# Patient Record
Sex: FEMALE
[De-identification: names, ages, dates, MRNs, and addresses within clinical notes are randomized per-mention and may not be internally consistent; named-entity substitution may affect disease eponyms.]

---

## 2022-11-17 ENCOUNTER — APPOINTMENT (OUTPATIENT)
Dept: INTERNAL MEDICINE | Facility: OTHER | Age: 62
End: 2022-11-17

## 2022-11-17 VITALS
DIASTOLIC BLOOD PRESSURE: 73 MMHG | HEART RATE: 72 BPM | RESPIRATION RATE: 16 BRPM | SYSTOLIC BLOOD PRESSURE: 126 MMHG | TEMPERATURE: 98.1 F

## 2022-11-17 DIAGNOSIS — Z86.79 PERSONAL HISTORY OF OTHER DISEASES OF THE CIRCULATORY SYSTEM: ICD-10-CM

## 2022-11-17 DIAGNOSIS — R04.0 EPISTAXIS: ICD-10-CM

## 2022-11-17 DIAGNOSIS — Z00.01 ENCOUNTER FOR GENERAL ADULT MEDICAL EXAMINATION WITH ABNORMAL FINDINGS: ICD-10-CM

## 2022-11-17 DIAGNOSIS — K21.9 GASTRO-ESOPHAGEAL REFLUX DISEASE W/OUT ESOPHAGITIS: ICD-10-CM

## 2022-11-17 DIAGNOSIS — F17.200 NICOTINE DEPENDENCE, UNSPECIFIED, UNCOMPLICATED: ICD-10-CM

## 2022-11-17 DIAGNOSIS — K59.09 OTHER CONSTIPATION: ICD-10-CM

## 2022-11-17 DIAGNOSIS — Z86.19 PERSONAL HISTORY OF OTHER INFECTIOUS AND PARASITIC DISEASES: ICD-10-CM

## 2022-11-17 DIAGNOSIS — Z82.49 FAMILY HISTORY OF ISCHEMIC HEART DISEASE AND OTHER DISEASES OF THE CIRCULATORY SYSTEM: ICD-10-CM

## 2022-11-17 PROBLEM — Z00.00 ENCOUNTER FOR PREVENTIVE HEALTH EXAMINATION: Status: ACTIVE | Noted: 2022-11-17

## 2022-11-17 PROCEDURE — 99212 OFFICE O/P EST SF 10 MIN: CPT | Mod: 25

## 2022-11-17 PROCEDURE — 99386 PREV VISIT NEW AGE 40-64: CPT | Mod: GY,25

## 2022-11-17 RX ORDER — IBUPROFEN 800 MG/1
800 TABLET, FILM COATED ORAL 3 TIMES DAILY
Refills: 0 | Status: ACTIVE | COMMUNITY
Start: 2022-11-17

## 2023-02-16 ENCOUNTER — APPOINTMENT (OUTPATIENT)
Dept: INTERNAL MEDICINE | Facility: OTHER | Age: 63
End: 2023-02-16
Payer: MEDICARE

## 2023-02-16 VITALS
DIASTOLIC BLOOD PRESSURE: 80 MMHG | RESPIRATION RATE: 16 BRPM | SYSTOLIC BLOOD PRESSURE: 160 MMHG | HEART RATE: 70 BPM | OXYGEN SATURATION: 99 % | TEMPERATURE: 98 F

## 2023-02-16 PROCEDURE — 99213 OFFICE O/P EST LOW 20 MIN: CPT

## 2023-02-16 NOTE — HISTORY OF PRESENT ILLNESS
[FreeTextEntry1] : Follow up [de-identified] : This is a 62F with PMHx of HTN (on lisinopril), chronic constipation, hx of syphilis, who presents for folllow up. She is complaining of tooth abscesses today, where she's had "rotting teeth and swelling for one year". she has never seen a dentist. Admits to swelling, but denies fevers, chills, purulent discharge, redness, jaw pain, ear pain, in the affected area. She would like to see a dentist to have her teeth removed.\par \par She remains compliant on lisinopril and typically takes the medication at night. Denies chest pain, headache, palpitations, LE edema, SOB, or other related symptoms.

## 2023-02-16 NOTE — ASSESSMENT
[FreeTextEntry1] : #HTN\par - C/w lisinopril\par - BP in patient 160s/80s, asymptomatic\par - Refills today\par - Obtain labs: CMP, Urine alb/Cr for baseline\par - F/u in 4 weeks for BP checkup\par \par #Gingivitis\par - Advised SW for dental care assistance\par - Swelling around gums in bottom molars, no purulent discharge, masses\par - No fevers, chills, jaw pain, ear pain, throat pain, LAD on exam\par \par #HCM\par - Discussed importance of mammogram and c-scope this visit, will give referral on next visit\par -Deferred covid/flu shot\par - Labs to be obtained: CBC, CMP, Urine Alb/Cr, Lipid panel, TSH, Hep C, HIV\par - Amenable to tdap shot, deferred shingrix

## 2023-02-16 NOTE — END OF VISIT
[] : Resident [FreeTextEntry3] : 62F presenting for follow up to [facility]. Reports dental discomfort, would like a referral to a dentist, has not seen one for some time. Has been traveling, has lisinopril medication bottle with her with most recent refill from prescriber from [facility] fall 2022. On exam, flat affect, answers questions  Very poor dentition, multiple broken teeth, +gingivitis, no active bleeding or drainage. BP above goal. Reordered lab work from initial visit and requested that she have it drawn at [address] [place] [address] lab when able. Dental referral placed, sent email to her SW at the [facility], [email or web address], to request assistance in finding pt a dentist covered by her plan. RTC 1 month for f/u above or earlier prn.

## 2023-03-23 ENCOUNTER — APPOINTMENT (OUTPATIENT)
Dept: INTERNAL MEDICINE | Facility: OTHER | Age: 63
End: 2023-03-23
Payer: MEDICARE

## 2023-03-23 VITALS
DIASTOLIC BLOOD PRESSURE: 97 MMHG | SYSTOLIC BLOOD PRESSURE: 183 MMHG | OXYGEN SATURATION: 97 % | HEART RATE: 83 BPM | TEMPERATURE: 97.3 F

## 2023-03-23 DIAGNOSIS — K05.10 CHRONIC GINGIVITIS, PLAQUE INDUCED: ICD-10-CM

## 2023-03-23 DIAGNOSIS — I10 ESSENTIAL (PRIMARY) HYPERTENSION: ICD-10-CM

## 2023-03-23 PROCEDURE — 99213 OFFICE O/P EST LOW 20 MIN: CPT | Mod: GC

## 2023-03-23 RX ORDER — LISINOPRIL 10 MG/1
10 TABLET ORAL DAILY
Qty: 30 | Refills: 0 | Status: DISCONTINUED | COMMUNITY
Start: 2022-11-17 | End: 2023-03-23

## 2023-03-23 RX ORDER — FAMOTIDINE 40 MG/1
40 TABLET, FILM COATED ORAL DAILY
Qty: 30 | Refills: 1 | Status: DISCONTINUED | COMMUNITY
Start: 2022-11-17 | End: 2023-03-23

## 2023-03-23 RX ORDER — AMLODIPINE BESYLATE 5 MG/1
5 TABLET ORAL
Qty: 90 | Refills: 3 | Status: ACTIVE | COMMUNITY
Start: 2023-03-23 | End: 1900-01-01

## 2023-03-23 RX ORDER — CALCIUM CARBONATE 500 MG/1
500 TABLET, CHEWABLE ORAL 3 TIMES DAILY
Qty: 90 | Refills: 0 | Status: DISCONTINUED | COMMUNITY
Start: 2022-11-17 | End: 2023-03-23

## 2023-03-26 PROBLEM — I10 HYPERTENSION, ESSENTIAL: Status: ACTIVE | Noted: 2022-11-17

## 2023-03-26 PROBLEM — K05.10 GINGIVITIS, CHRONIC: Status: ACTIVE | Noted: 2023-02-16

## 2023-03-26 NOTE — END OF VISIT
[] : Resident [FreeTextEntry3] : Dental/ gum pain - taking daily nsaids.  Pt will set up a dental appt. \par HTN - Unknown current BMP/cr/ electrolytes while taking acei. For medication interaction safety , change BP med to CCB. \par RTO 1 mo\par Labs this week/next wk.

## 2023-03-26 NOTE — REVIEW OF SYSTEMS
[Fever] : no fever [Chills] : no chills [Chest Pain] : no chest pain [Lower Ext Edema] : no lower extremity edema [Shortness Of Breath] : no shortness of breath [Abdominal Pain] : no abdominal pain [Nausea] : no nausea [Constipation] : no constipation [Diarrhea] : diarrhea [Vomiting] : no vomiting [Heartburn] : no heartburn [Melena] : no melena [Dysuria] : no dysuria [Frequency] : no frequency [Headache] : no headache [FreeTextEntry7] : -no blood in stool

## 2023-03-26 NOTE — HISTORY OF PRESENT ILLNESS
[FreeTextEntry1] : tooth pain [de-identified] : 62F with PMHx of HTN (on lisinopril), chronic constipation, hx of syphilis, who presents for follow-up visit and BP check. Pt c/o persistent tooth and gum pain x 1 year. She last saw a dentist in September 2022, who wanted to remove one of her teeth, but pt declined at that time because they did not prescribe antibiotics. She was recently in [place] and an NP prescribed amoxicillin 875, she finished the course but has had no relief of her symptoms. She c/o persistent tooth/gum pain bilaterally, has noticed purulent drainage. Pt uses ibuprofen daily for tooth pain, tylenol hurts her stomach. Denies headaches, fevers, chills, CP, SOB, abd pain, n/v/c/d. No other complaints at this time. Pt has had HTN for 15 years and was previously on enalapril, has been on lisinopril 10 mg for 2 years w/ no recent changes. She does not check her BP outside visits. She would like a refill on her lisinopril. Pt is an active smoker, 1 ppd, not interested in cutting down or quitting at this time as she has a lot of stressors and smokes to help relieve her stress. She is not interested in talking about these stressors with a therapist at this time.

## 2023-03-26 NOTE — PHYSICAL EXAM
[de-identified] : +poor dentition, swollen gums [de-identified] : +guarded, not interested in discussing personal topics

## 2023-03-26 NOTE — ASSESSMENT
[FreeTextEntry1] : 62F with PMHx of HTN (on lisinopril), chronic constipation, hx of syphilis, who presents for follow-up visit and BP check.\par \par #HTN\par Home meds: lisinopril 10 mg. Pt had HTN for 15 years and has been on lisinopril 10 mg for ~2 years w/ no recent changes. She previously used enalapril. Believes she has tried amlodipine in the past w/ no adverse effects. Pt uses ibuprofen daily for tooth pain, and is not able to take tylenol because it causes abdominal pain. /97 today, 178/91 on repeat, not at goal.\par - d/c lisinopril, avoid ACE-i w/ daily NSAID use\par - start amlodipine 5 mg qd -- sent to pharmacy\par - advised to keep BP log\par - lifestyle and dietary modifications discussed at length w/ pt\par - counseled on smoking cessation\par - obtain labs: CMP, urine alb/Cr for baseline (at [facility] tomorrow)\par - f/u in 4 weeks for BP check -- consider increasing amlodipine\par \par #Gingivitis/tooth pain\par Pt has gingivitis and tooth pain for ~ 1 year, saw a dentist in September 2022 who wanted to remove one of her teeth, but pt declined at that time because they did not prescribe abx. She was also prescribed amoxicillin 875 by an NP in [place] and completed the course w/ no relief. Currently reports pain in her teeth/gums and purulent drainage. No feves, chills, jaw pain, ear pain, throat pain. No purulent discharge noted on exam, however pt has poor dentition and swollen gums.\par - advised to discuss w/ SW for dental clinic at [facility] -- pt is agreeable\par \par #HCM\par - deferred COVID vaccine\par - deferred flu shot\par - pt needs referral for c-scope and mammogram during next visit\par - labs to be obtained at [facility]: CBC, CMP, urine alb/Cr, lipid panel, TSH, hep C, HIV\par - amenable to tdap shot\par - deferred shingrix\par \par RTC in 4 weeks for BP check at [facility]. Labs tomorrow 3/24 at [facility].

## 2023-05-09 ENCOUNTER — EMERGENCY (EMERGENCY)
Facility: HOSPITAL | Age: 63
LOS: 1 days | Discharge: HOME | End: 2023-05-09
Admitting: EMERGENCY MEDICINE
Payer: MEDICARE

## 2023-05-09 VITALS
TEMPERATURE: 98 F | RESPIRATION RATE: 18 BRPM | OXYGEN SATURATION: 100 % | WEIGHT: 158.07 LBS | HEART RATE: 78 BPM | SYSTOLIC BLOOD PRESSURE: 177 MMHG | DIASTOLIC BLOOD PRESSURE: 82 MMHG

## 2023-05-09 DIAGNOSIS — K04.7 PERIAPICAL ABSCESS WITHOUT SINUS: ICD-10-CM

## 2023-05-09 DIAGNOSIS — I10 ESSENTIAL (PRIMARY) HYPERTENSION: ICD-10-CM

## 2023-05-09 DIAGNOSIS — F17.200 NICOTINE DEPENDENCE, UNSPECIFIED, UNCOMPLICATED: ICD-10-CM

## 2023-05-09 DIAGNOSIS — K02.9 DENTAL CARIES, UNSPECIFIED: ICD-10-CM

## 2023-05-09 DIAGNOSIS — F31.9 BIPOLAR DISORDER, UNSPECIFIED: ICD-10-CM

## 2023-05-09 DIAGNOSIS — K08.89 OTHER SPECIFIED DISORDERS OF TEETH AND SUPPORTING STRUCTURES: ICD-10-CM

## 2023-05-09 PROCEDURE — 99283 EMERGENCY DEPT VISIT LOW MDM: CPT

## 2023-05-09 NOTE — ED PROVIDER NOTE - NSFOLLOWUPINSTRUCTIONS_ED_ALL_ED_FT
Take the antibiotic as prescribed.    Follow up at [facility] (walk in tomorrow morning):  Address: [address] [place] [id number]  Hours: 8:30 am - 5:00 pm  Phone: [phone]  Appointments: [facility]

## 2023-05-09 NOTE — ED ADULT NURSE NOTE - OBJECTIVE STATEMENT
Patient /co of toothache X 6 months on right lower jaw and left upper jaw with some pus discharge, no fever /chills, states taking Motrin with mild relief.

## 2023-05-09 NOTE — ED PROVIDER NOTE - PHYSICAL EXAMINATION
GENERAL: well-appearing older female, NAD  HEAD: NC/AT  EYE: anicteric sclera, no conjunctival injection  ENT: broken right lower molars and left upper molars with dental caries.  no facial or intraoral swelling appreciated. no trismus. voice normal.  no tenderness or swelling over maxillary sinuses.   CV:  RRR S1S2, no murmur  PULM: CTAB  GI:  :  SKIN: normal color and turgor, no rash  MSK: neck supple, no CCE  NEURO: alert with clear sensorium.  speech clear.  RM. Gait steady.  PSYCH: pleasant and calm, mood and affect appropriate.  denies SI/HI/hallucinations.  not currently on antidepressants, states she keeps her depression under control with daily exercise/walking "it keeps my head clear".

## 2023-05-09 NOTE — ED PROVIDER NOTE - OBJECTIVE STATEMENT
62-year-old female with a history of hypertension, bipolar disorder who presents to the ED complaining of dental pain and sinus pressure.  She states she has been diagnosed with dental infection that went to the bone approximately 6 months ago; has been treated with antibiotics with temporary relief of her symptoms but have recurred.  Pain is mainly in the right upper molars in the left lower molars.  She has nasal congestion, no headache, no fever,  no throat pain, no stiff neck, no chest pain, palpitations, syncope, shortness of breath.

## 2023-05-09 NOTE — ED PROVIDER NOTE - CLINICAL SUMMARY MEDICAL DECISION MAKING FREE TEXT BOX
Patient with chronic dental infection/pain; well-appearing/nontoxic, afebrile-do not suspect bacteremia.  No neck stiffness or intraoral swelling to suggest retropharyngeal abscess or peritonsillar abscess.  No headache or stiff neck to suggest meningitis.  Will prescribe course of antibiotics and referred to [facility].  Patient also requesting social work/case management  to help find a more appropriate homeless shelter for her.  No evidence of danger to self or others.

## 2023-05-09 NOTE — ED PROVIDER NOTE - PATIENT PORTAL LINK FT
You can access the FollowMyHealth Patient Portal offered by [facility] by registering at the following website: [email or web address]. By joining [facility]’s FollowMyHealth portal, you will also be able to view your health information using other applications (apps) compatible with our system.